# Patient Record
Sex: MALE | Race: WHITE | URBAN - METROPOLITAN AREA
[De-identification: names, ages, dates, MRNs, and addresses within clinical notes are randomized per-mention and may not be internally consistent; named-entity substitution may affect disease eponyms.]

---

## 2017-10-13 ENCOUNTER — OFFICE VISIT (OUTPATIENT)
Dept: OPHTHALMOLOGY | Facility: CLINIC | Age: 67
End: 2017-10-13

## 2017-10-13 DIAGNOSIS — H52.00 HYPERMETROPIA, UNSPECIFIED LATERALITY: ICD-10-CM

## 2017-10-13 DIAGNOSIS — H26.499 PCO (POSTERIOR CAPSULAR OPACIFICATION), UNSPECIFIED LATERALITY: Primary | ICD-10-CM

## 2017-10-13 RX ORDER — ASPIRIN 325 MG
1 TABLET ORAL DAILY
COMMUNITY

## 2017-10-13 ASSESSMENT — REFRACTION
OD_SPHERE: PLANO
OD_ADD: +2.50
OD_AXIS: 085
OS_ADD: +2.50
OD_CYLINDER: +0.75
OS_SPHERE: +0.50

## 2017-10-13 ASSESSMENT — TONOMETRY
OD_IOP_MMHG: 14
IOP_METHOD: ICARE
OS_IOP_MMHG: 14

## 2017-10-13 ASSESSMENT — EXTERNAL EXAM - RIGHT EYE: OD_EXAM: NORMAL

## 2017-10-13 ASSESSMENT — REFRACTION_WEARINGRX
OS_ADD: +2.50
OS_CYLINDER: +0.25
OD_SPHERE: PLANO
OD_ADD: +2.50
OD_AXIS: 072
OD_CYLINDER: +0.50
OS_AXIS: 085
OS_SPHERE: -0.25
SPECS_TYPE: PAL

## 2017-10-13 ASSESSMENT — CONF VISUAL FIELD
OS_NORMAL: 1
OD_NORMAL: 1

## 2017-10-13 ASSESSMENT — REFRACTION_MANIFEST
OD_SPHERE: +0.25
OD_CYLINDER: +0.75
OS_SPHERE: +0.50
OS_CYLINDER: SPHERE
OD_AXIS: 085

## 2017-10-13 ASSESSMENT — CUP TO DISC RATIO
OS_RATIO: 0.3
OD_RATIO: 0.3

## 2017-10-13 ASSESSMENT — VISUAL ACUITY
OD_CC+: -2
OS_CC: 20/20
CORRECTION_TYPE: GLASSES
OS_CC+: -1
OD_CC: 20/30
METHOD: SNELLEN - LINEAR

## 2017-10-13 ASSESSMENT — SLIT LAMP EXAM - LIDS
COMMENTS: NORMAL
COMMENTS: NORMAL

## 2017-10-13 ASSESSMENT — EXTERNAL EXAM - LEFT EYE: OS_EXAM: NORMAL

## 2017-10-13 NOTE — NURSING NOTE
Chief Complaints and History of Present Illnesses   Patient presents with     COMPREHENSIVE EYE EXAM     HPI    Affected eye(s):  Both   Symptoms:     Blurred vision   No floaters   No flashes   No Dryness   No eye discharge      Duration:  4 months   Frequency:  Constant       Do you have eye pain now?:  No      Comments:  Sometimes sees a 'split image' when looking at a distance.  Lizbeth OLIVEROS 3:39 PM 10/13/2017

## 2017-10-13 NOTE — MR AVS SNAPSHOT
After Visit Summary   10/13/2017    EDIS Batres    MRN: [id number]           Patient Information     Date Of Birth          1950        Visit Information        Provider Department      10/13/2017 3:00 PM Derek Garcia MD [facility]        Today's Diagnoses     PCO (posterior capsular opacification), unspecified laterality - Left Eye    -  1    Hypermetropia, unspecified laterality           Follow-ups after your visit        Follow-up notes from your care team     Return in about 1 year (around 10/13/2018) for Complete Eye Exam, PCO.      Who to contact     Please call your clinic at [phone] to:    Ask questions about your health    Make or cancel appointments    Discuss your medicines    Learn about your test results    Speak to your doctor   If you have compliments or concerns about an experience at your clinic, or if you wish to file a complaint, please contact [facility] Patient Relations at [phone] or email us at [email or web address]         Additional Information About Your Visit        MyChart Information     Apruve is an electronic gateway that provides easy, online access to your medical records. With Apruve, you can request a clinic appointment, read your test results, renew a prescription or communicate with your care team.     To sign up for Apruve visit the website at [email or web address]   You will be asked to enter the access code listed below, as well as some personal information. Please follow the directions to create your username and password.     Your access code is: [email or web address]  Expires: 2017  6:30 AM     Your access code will  in 90 days. If you need help or a new code, please contact your [facility] or call [phone] for assistance.        Care EveryWhere ID     This is your Care EveryWhere ID. This could be used by other  organizations to access your [facility] medical records  [id number]         Blood Pressure from Last 3 Encounters:   No data found for BP    Weight from Last 3 Encounters:   No data found for Wt              Today, you had the following     No orders found for display       Primary Care Provider Office Phone # Fax #    Rolando Mccloud [phone]       [facility] [place] [address]  [place] [address]        Equal Access to Services     [facility] Center[facility] : Hadii aad ku hadasho Soomaali, waaxda luqadaha, qaybta kaalmada adeegyada, waxay idiin hayaan adeeg kharash la'aan . So [facility] [phone].    ATENCIÓN: Si habla español, tiene a davis disposición servicios gratuitos de asistencia lingüística. Llame al [phone].    We comply with applicable federal civil rights laws and [place] laws. We do not discriminate on the basis of race, color, national origin, age, disability, sex, sexual orientation, or gender identity.            Thank you!     Thank you for choosing [facility]  for your care. Our goal is always to provide you with excellent care. Hearing back from our patients is one way we can continue to improve our services. Please take a few minutes to complete the written survey that you may receive in the mail after your visit with us. Thank you!             Your Updated Medication List - Protect others around you: Learn how to safely use, store and throw away your medicines at www.disposemymeds.org.          This list is accurate as of: 10/13/17 11:59 PM.  Always use your most recent med list.                   Brand Name Dispense Instructions for use Diagnosis    aspirin 325 MG tablet      Take 1 tablet by mouth daily        CARTIA XT PO      Take 1 tablet by mouth daily        CLARITIN-D 24 HOUR PO      Take 1 tablet by mouth daily        LIPITOR PO           MULTIVITAL Tabs      Take 1 tablet by mouth daily

## 2017-10-23 NOTE — PROGRESS NOTES
Assessment & Plan      EDIS Batres is a 67 year old male with the following diagnoses:   (H26.499) PCO (posterior capsular opacification), unspecified laterality - Left Eye  (primary encounter diagnosis)  Comment: Mild  Plan: Follow    (H52.00) Hypermetropia, unspecified laterality  Comment: Change  Plan: Rx     -----------------------------------------------------------------------------------      Patient disposition:   Return in about 1 year (around 10/13/2018) for Complete Eye Exam, PCO. or sooner as needed.    Complete documentation of historical and exam elements from today's encounter can  be found in the full encounter summary report (not reduplicated in this progress  note). I personally obtained the chief complaint(s) and history of present illness. I  confirmed and edited as necessary the review of systems, past medical/surgical  history, family history, social history, and examination findings as documented by  others; and I examined the patient myself. I personally reviewed the relevant tests,  images, and reports as documented above. I formulated and edited as necessary the  assessment and plan and discussed the findings and management plan with the  patient and family.    RENAN Garcia M.D    Mild